# Patient Record
(demographics unavailable — no encounter records)

---

## 2024-11-12 NOTE — REASON FOR VISIT
[Follow-Up: _____] : a [unfilled] follow-up visit [FreeTextEntry1] : The patient comes in with a family history of breast cancer and a personal history of a right breast mastectomy and sentinel lymph node biopsy followed by completion axillary lymph node dissection performed in 2008 by Dr. 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  She underwent adjuvant chemotherapy and took tamoxifen for 2 years followed by Femara for 3 years.  She comes in for routine follow-up and continues to get left breast mammography and ultrasound. [FreeTextEntry2] : March 17, 2008

## 2024-11-12 NOTE — PHYSICAL EXAM
[Normocephalic] : normocephalic [Atraumatic] : atraumatic [EOMI] : extra ocular movement intact [Supple] : supple [No Supraclavicular Adenopathy] : no supraclavicular adenopathy [No Cervical Adenopathy] : no cervical adenopathy [Examined in the supine and seated position] : examined in the supine and seated position [No dominant masses] : no dominant masses in right breast  [No dominant masses] : no dominant masses left breast [No Nipple Retraction] : no left nipple retraction [No Nipple Discharge] : no left nipple discharge [Breast Mass Right Breast ___cm] : no masses [Breast Nipple Inversion Left] : nipple not inverted [Breast Nipple Retraction Left] : nipple not retracted [Breast Nipple Flattening Left] : nipple not flattened [Breast Nipple Fissures Left] : nipple not fissured [Breast Abnormal Lactation (Galactorrhea) Left] : no galactorrhea [Breast Abnormal Secretion Bloody Fluid Left] : no bloody discharge [Breast Abnormal Secretion Serous Fluid Left] : no serous discharge [Breast Abnormal Secretion Opalescent Fluid Left] : no milky discharge [Breast Mass Left Breast ___cm] : no masses [No Axillary Lymphadenopathy] : no left axillary lymphadenopathy [No Edema] : no edema [No Rashes] : no rashes [No Ulceration] : no ulceration [de-identified] : On exam, the patient has the obvious mastectomy and implant reconstruction on the right side with a much smaller breast on the right than the left.  She has a ptotic C-cup breast on the left.  On palpation, she has no evidence of recurrence over the right implant and no suspicious findings in the left breast.  She has no axillary, supraclavicular, or cervical adenopathy. [de-identified] : Status post total mastectomy and implant reconstruction with no evidence of recurrence.

## 2024-11-12 NOTE — HISTORY OF PRESENT ILLNESS
[FreeTextEntry1] : The patient is a 68-year-old nulliparous postmenopausal white female.  She underwent menarche at age 13.  She underwent menopause at age 52 and never took any hormone replacement therapy.  She has a family history with her niece who had breast cancer in her late 30s and a paternal cousin had breast cancer at age 45.  Her mother was a smoker and had lung cancer at age 64.  Her father had Parkinson's disease.  The patient noted a right breast 6:00 palpable density in 2008 and was diagnosed with an invasive duct cancer and underwent a right breast modified radical mastectomy by Dr. Baltazar on March 17, 2008.  Final pathology showed a 2 cm poorly differentiated invasive duct cancer and she had 1 out of 10 positive nodes with a micrometastasis with the largest focus measuring 1 mm.  The cancer was ER strongly positive at 60% and ME strongly positive at 75% and was HER-2/sergey negative by IHC with a FISH ratio of 1.4.  She underwent chemotherapy with Dr. Mukherjee for what is currently a pathologic prognostic stage IA breast cancer.  She had an implant reconstruction at the time of mastectomy and took tamoxifen for couple years and then Femara to finish a full 5 years of endocrine therapy.  She comes in for routine follow-up and continues to get routine left breast mammography and ultrasound.

## 2024-11-12 NOTE — ASSESSMENT
[FreeTextEntry1] : The patient is a 68-year-old nulliparous postmenopausal white female. She underwent menarche at age 13. She underwent menopause at age 52 and never took any hormone replacement therapy. She has a family history with her niece who had breast cancer in her late 30s and a paternal cousin had breast cancer at age 45. Her mother was a smoker and had lung cancer at age 64. Her father had Parkinson's disease. The patient noted a right breast 6:00 palpable density in 2008 and was diagnosed with an invasive duct cancer and underwent a right breast modified radical mastectomy by Dr. Baltazar on March 17, 2008. Final pathology showed a 2 cm poorly differentiated invasive duct cancer and she had 1 out of 10 positive nodes with a micrometastasis with the largest focus measuring 1 mm. The cancer was ER strongly positive at 60% and PA strongly positive at 75% and was HER-2/sergey negative by IHC with a FISH ratio of 1.4. She underwent chemotherapy with Dr. Mukherjee for what is currently a pathologic prognostic stage IA breast cancer. She had an implant reconstruction at the time of mastectomy and took tamoxifen for couple years and then Femara to finish a full 5 years of endocrine therapy. The patient underwent her last left breast mammography and ultrasound which was reviewed from ?????? November 15, 2023 and performed at [facility] which showed no suspicious findings. On exam today, I cannot feel any evidence of recurrence over the right implant and no suspicious findings in the left breast. The patient was reassured and should follow-up again in 1 year in December 2025. Her next left breast mammography and ultrasound will be due in ?????? November 2025 and she was given prescriptions.

## 2024-12-04 NOTE — REASON FOR VISIT
[Follow-Up: _____] : a [unfilled] follow-up visit [FreeTextEntry1] : The patient comes in with a family history of breast cancer and a personal history of a right breast mastectomy and sentinel lymph node biopsy followed by completion axillary lymph node dissection performed in 2008 by Dr. Baltazar for a 2 cm ER/OK positive poorly differentiated invasive duct cancer and she did have a positive sentinel lymph node with a micromet out of 10 nodes removed (pathologic prognostic stage IA).  She underwent adjuvant chemotherapy and took tamoxifen for 2 years followed by Femara for 3 years.  She comes in for routine follow-up and continues to get left breast mammography and ultrasound. [FreeTextEntry2] : March 17, 2008

## 2024-12-04 NOTE — PAST MEDICAL HISTORY
[Postmenopausal] : The patient is postmenopausal [Menarche Age ____] : age at menarche was [unfilled] [Menopause Age____] : age at menopause was [unfilled] [Total Preg ___] : G[unfilled] [Live Births ___] : P[unfilled]  [Age At Live Birth ___] : Age at live birth: [unfilled] [History of Hormone Replacement Treatment] : has no history of hormone replacement treatment Arava Counseling:  Patient counseled regarding adverse effects of Arava including but not limited to nausea, vomiting, abnormalities in liver function tests. Patients may develop mouth sores, rash, diarrhea, and abnormalities in blood counts. The patient understands that monitoring is required including LFTs and blood counts.  There is a rare possibility of scarring of the liver and lung problems that can occur when taking methotrexate. Persistent nausea, loss of appetite, pale stools, dark urine, cough, and shortness of breath should be reported immediately. Patient advised to discontinue Arava treatment and consult with a physician prior to attempting conception. The patient will have to undergo a treatment to eliminate Arava from the body prior to conception.

## 2024-12-04 NOTE — HISTORY OF PRESENT ILLNESS
[FreeTextEntry1] : The patient is a 68-year-old nulliparous postmenopausal white female.  She underwent menarche at age 13.  She underwent menopause at age 52 and never took any hormone replacement therapy.  She has a family history with her niece who had breast cancer in her late 30s and a paternal cousin had breast cancer at age 45.  Her niece passed away from metastatic breast cancer at age 47.   Her mother was a smoker and had lung cancer at age 64.  Her father had Parkinson's disease.  The patient noted a right breast 6:00 palpable density in 2008 and was diagnosed with an invasive duct cancer and underwent a right breast modified radical mastectomy by Dr. Baltazar on March 17, 2008.  Final pathology showed a 2 cm poorly differentiated invasive duct cancer and she had 1 out of 10 positive nodes with a micrometastasis with the largest focus measuring 1 mm.  The cancer was ER strongly positive at 60% and MD strongly positive at 75% and was HER-2/sergey negative by IHC with a FISH ratio of 1.4.  She underwent chemotherapy with Dr. Mukherjee for what is currently a pathologic prognostic stage IA breast cancer.  She had an implant reconstruction at the time of mastectomy and took tamoxifen for couple years and then Femara to finish a full 5 years of endocrine therapy.  She comes in for routine follow-up and continues to get routine left breast mammography and ultrasound.

## 2024-12-04 NOTE — PHYSICAL EXAM
[Normocephalic] : normocephalic [Atraumatic] : atraumatic [EOMI] : extra ocular movement intact [Supple] : supple [No Supraclavicular Adenopathy] : no supraclavicular adenopathy [No Cervical Adenopathy] : no cervical adenopathy [Examined in the supine and seated position] : examined in the supine and seated position [No dominant masses] : no dominant masses in right breast  [No dominant masses] : no dominant masses left breast [No Nipple Retraction] : no left nipple retraction [No Nipple Discharge] : no left nipple discharge [Breast Mass Right Breast ___cm] : no masses [Breast Mass Left Breast ___cm] : no masses [No Axillary Lymphadenopathy] : no left axillary lymphadenopathy [No Edema] : no edema [No Rashes] : no rashes [No Ulceration] : no ulceration [Breast Nipple Inversion Left] : nipple not inverted [Breast Nipple Retraction Left] : nipple not retracted [Breast Nipple Flattening Left] : nipple not flattened [Breast Nipple Fissures Left] : nipple not fissured [Breast Abnormal Lactation (Galactorrhea) Left] : no galactorrhea [Breast Abnormal Secretion Bloody Fluid Left] : no bloody discharge [Breast Abnormal Secretion Serous Fluid Left] : no serous discharge [Breast Abnormal Secretion Opalescent Fluid Left] : no milky discharge [de-identified] : On exam, the patient has the obvious mastectomy and implant reconstruction on the right side with a much smaller breast on the right than the left.  She has a ptotic C-cup breast on the left.  On palpation, she has no evidence of recurrence over the right implant and no suspicious findings in the left breast.  She has no axillary, supraclavicular, or cervical adenopathy. [de-identified] : Status post total mastectomy and implant reconstruction with no evidence of recurrence.

## 2024-12-04 NOTE — REASON FOR VISIT
[Follow-Up: _____] : a [unfilled] follow-up visit [FreeTextEntry1] : The patient comes in with a family history of breast cancer and a personal history of a right breast mastectomy and sentinel lymph node biopsy followed by completion axillary lymph node dissection performed in 2008 by Dr. Baltazar for a 2 cm ER/PA positive poorly differentiated invasive duct cancer and she did have a positive sentinel lymph node with a micromet out of 10 nodes removed (pathologic prognostic stage IA).  She underwent adjuvant chemotherapy and took tamoxifen for 2 years followed by Femara for 3 years.  She comes in for routine follow-up and continues to get left breast mammography and ultrasound. [FreeTextEntry2] : March 17, 2008

## 2024-12-04 NOTE — HISTORY OF PRESENT ILLNESS
[FreeTextEntry1] : The patient is a 68-year-old nulliparous postmenopausal white female.  She underwent menarche at age 13.  She underwent menopause at age 52 and never took any hormone replacement therapy.  She has a family history with her niece who had breast cancer in her late 30s and a paternal cousin had breast cancer at age 45.  Her niece passed away from metastatic breast cancer at age 47.   Her mother was a smoker and had lung cancer at age 64.  Her father had Parkinson's disease.  The patient noted a right breast 6:00 palpable density in 2008 and was diagnosed with an invasive duct cancer and underwent a right breast modified radical mastectomy by Dr. Baltazar on March 17, 2008.  Final pathology showed a 2 cm poorly differentiated invasive duct cancer and she had 1 out of 10 positive nodes with a micrometastasis with the largest focus measuring 1 mm.  The cancer was ER strongly positive at 60% and NH strongly positive at 75% and was HER-2/sergey negative by IHC with a FISH ratio of 1.4.  She underwent chemotherapy with Dr. Mukherjee for what is currently a pathologic prognostic stage IA breast cancer.  She had an implant reconstruction at the time of mastectomy and took tamoxifen for couple years and then Femara to finish a full 5 years of endocrine therapy.  She comes in for routine follow-up and continues to get routine left breast mammography and ultrasound.

## 2024-12-04 NOTE — ASSESSMENT
[FreeTextEntry1] : The patient is a 68-year-old nulliparous postmenopausal white female. She underwent menarche at age 13. She underwent menopause at age 52 and never took any hormone replacement therapy. She has a family history with her niece who had breast cancer in her late 30s and a paternal cousin had breast cancer at age 45.  Her niece passed away from metastatic breast cancer at age 47.  Her mother was a smoker and had lung cancer at age 64. Her father had Parkinson's disease. The patient noted a right breast 6:00 palpable density in  and was diagnosed with an invasive duct cancer and underwent a right breast modified radical mastectomy by Dr. Baltazar on 2008. Final pathology showed a 2 cm poorly differentiated invasive duct cancer and she had 1 out of 10 positive nodes with a micrometastasis with the largest focus measuring 1 mm. The cancer was ER strongly positive at 60% and DE strongly positive at 75% and was HER-2/sergey negative by IHC with a FISH ratio of 1.4. She underwent chemotherapy with Dr. Mukherjee for what is currently a pathologic prognostic stage IA breast cancer. She had an implant reconstruction at the time of mastectomy and took tamoxifen for couple years and then Femara to finish a full 5 years of endocrine therapy. The patient underwent her last left breast mammography and ultrasound which was reviewed from 2024 and performed at [facility] which showed no suspicious findings.  There was questionable silicone granuloma however seen in the left axillary lymph nodes though this would be difficult given the fact that her implant is on the contralateral side.  I would like her to just get an MRI without contrast which she will get performed up at [facility] just to rule out an implant rupture.  On exam today, I cannot feel any evidence of recurrence over the right implant and no suspicious findings in the left breast. The patient was reassured and should follow-up again in 1 year in 2025. Her next left breast mammography and ultrasound will be due in 2025 and she was given prescriptions.  If there are signs of an implant rupture I will send her to Dr. Pabon and I gave her his card in case she needs to see him.  Unfortunately, the patient tells me that her niece who had breast cancer recently  from metastatic disease at age 47.

## 2024-12-04 NOTE — PHYSICAL EXAM
[Normocephalic] : normocephalic [Atraumatic] : atraumatic [EOMI] : extra ocular movement intact [Supple] : supple [No Supraclavicular Adenopathy] : no supraclavicular adenopathy [No Cervical Adenopathy] : no cervical adenopathy [Examined in the supine and seated position] : examined in the supine and seated position [No dominant masses] : no dominant masses in right breast  [No dominant masses] : no dominant masses left breast [No Nipple Retraction] : no left nipple retraction [No Nipple Discharge] : no left nipple discharge [Breast Mass Right Breast ___cm] : no masses [Breast Mass Left Breast ___cm] : no masses [No Axillary Lymphadenopathy] : no left axillary lymphadenopathy [No Edema] : no edema [No Rashes] : no rashes [No Ulceration] : no ulceration [Breast Nipple Inversion Left] : nipple not inverted [Breast Nipple Retraction Left] : nipple not retracted [Breast Nipple Flattening Left] : nipple not flattened [Breast Nipple Fissures Left] : nipple not fissured [Breast Abnormal Lactation (Galactorrhea) Left] : no galactorrhea [Breast Abnormal Secretion Bloody Fluid Left] : no bloody discharge [Breast Abnormal Secretion Serous Fluid Left] : no serous discharge [Breast Abnormal Secretion Opalescent Fluid Left] : no milky discharge [de-identified] : On exam, the patient has the obvious mastectomy and implant reconstruction on the right side with a much smaller breast on the right than the left.  She has a ptotic C-cup breast on the left.  On palpation, she has no evidence of recurrence over the right implant and no suspicious findings in the left breast.  She has no axillary, supraclavicular, or cervical adenopathy. [de-identified] : Status post total mastectomy and implant reconstruction with no evidence of recurrence.

## 2024-12-04 NOTE — ASSESSMENT
[FreeTextEntry1] : The patient is a 68-year-old nulliparous postmenopausal white female. She underwent menarche at age 13. She underwent menopause at age 52 and never took any hormone replacement therapy. She has a family history with her niece who had breast cancer in her late 30s and a paternal cousin had breast cancer at age 45.  Her niece passed away from metastatic breast cancer at age 47.  Her mother was a smoker and had lung cancer at age 64. Her father had Parkinson's disease. The patient noted a right breast 6:00 palpable density in  and was diagnosed with an invasive duct cancer and underwent a right breast modified radical mastectomy by Dr. Baltazar on 2008. Final pathology showed a 2 cm poorly differentiated invasive duct cancer and she had 1 out of 10 positive nodes with a micrometastasis with the largest focus measuring 1 mm. The cancer was ER strongly positive at 60% and AR strongly positive at 75% and was HER-2/sergey negative by IHC with a FISH ratio of 1.4. She underwent chemotherapy with Dr. Mukherjee for what is currently a pathologic prognostic stage IA breast cancer. She had an implant reconstruction at the time of mastectomy and took tamoxifen for couple years and then Femara to finish a full 5 years of endocrine therapy. The patient underwent her last left breast mammography and ultrasound which was reviewed from 2024 and performed at [facility] which showed no suspicious findings.  There was questionable silicone granuloma however seen in the left axillary lymph nodes though this would be difficult given the fact that her implant is on the contralateral side.  I would like her to just get an MRI without contrast which she will get performed up at [facility] just to rule out an implant rupture.  On exam today, I cannot feel any evidence of recurrence over the right implant and no suspicious findings in the left breast. The patient was reassured and should follow-up again in 1 year in 2025. Her next left breast mammography and ultrasound will be due in 2025 and she was given prescriptions.  If there are signs of an implant rupture I will send her to Dr. Pabon and I gave her his card in case she needs to see him.  Unfortunately, the patient tells me that her niece who had breast cancer recently  from metastatic disease at age 47.

## 2024-12-04 NOTE — ADDENDUM
[FreeTextEntry1] : I spent greater than 75% of the consultation in face-to-face counseling and coordination of care in this patient with a history of a right breast cancer who underwent a mastectomy and comes in now for breast cancer screening/surveillance.